# Patient Record
Sex: FEMALE | Race: BLACK OR AFRICAN AMERICAN | NOT HISPANIC OR LATINO | URBAN - METROPOLITAN AREA
[De-identification: names, ages, dates, MRNs, and addresses within clinical notes are randomized per-mention and may not be internally consistent; named-entity substitution may affect disease eponyms.]

---

## 2021-06-21 ENCOUNTER — OFFICE VISIT (OUTPATIENT)
Dept: URBAN - METROPOLITAN AREA CLINIC 96 | Facility: CLINIC | Age: 51
End: 2021-06-21
Payer: COMMERCIAL

## 2021-06-21 ENCOUNTER — WEB ENCOUNTER (OUTPATIENT)
Dept: URBAN - METROPOLITAN AREA CLINIC 96 | Facility: CLINIC | Age: 51
End: 2021-06-21

## 2021-06-21 VITALS
DIASTOLIC BLOOD PRESSURE: 85 MMHG | SYSTOLIC BLOOD PRESSURE: 127 MMHG | HEART RATE: 64 BPM | BODY MASS INDEX: 33.04 KG/M2 | TEMPERATURE: 97.8 F | HEIGHT: 68 IN | WEIGHT: 218 LBS

## 2021-06-21 DIAGNOSIS — R11.0 NAUSEA: ICD-10-CM

## 2021-06-21 DIAGNOSIS — R10.30 LOWER ABDOMINAL PAIN OF UNKNOWN ETIOLOGY: ICD-10-CM

## 2021-06-21 DIAGNOSIS — R10.84 ABDOMINAL CRAMPING, GENERALIZED: ICD-10-CM

## 2021-06-21 DIAGNOSIS — Z12.11 COLON CANCER SCREENING: ICD-10-CM

## 2021-06-21 PROCEDURE — 99244 OFF/OP CNSLTJ NEW/EST MOD 40: CPT | Performed by: INTERNAL MEDICINE

## 2021-06-21 RX ORDER — SODIUM SULFATE, MAGNESIUM SULFATE, AND POTASSIUM CHLORIDE 17.75; 2.7; 2.25 G/1; G/1; G/1
12 TABLETS TABLET ORAL
Qty: 24 TABLET | Refills: 0 | OUTPATIENT
Start: 2021-06-21 | End: 2021-06-22

## 2021-06-21 NOTE — HPI-TODAY'S VISIT:
The patient was referred by Dr. Rosalino Ryan for lower abd pains.   A copy of this document is being forwarded to the referring provider.  50 y.o. AAF, lives w/ daughter, works in IT support Seen in past by Dr. Pinto This yr, recently, having stomach issues After eating, will have really bad sharp pains associated w/ sweating Lower in nature Has to go to bathroom - will help it Pains last for 1 hr Not sure what is causing it Eating more fruits and veggies Last episode was w/ pizza No blood in stool Nausea associated

## 2021-06-21 NOTE — PHYSICAL EXAM CHEST:
no lesions,  no deformities,  no traumatic injuries,  no significant scars are present,  chest wall non-tender,  no masses present, breathing is unlabored without accessory muscle use, normal breath sounds
- - -

## 2021-06-22 LAB
A/G RATIO: 1.8
ALBUMIN: 4.6
ALKALINE PHOSPHATASE: 63
ALT (SGPT): 10
AST (SGOT): 18
BASO (ABSOLUTE): 0
BASOS: 1
BILIRUBIN, TOTAL: 0.2
BUN/CREATININE RATIO: 11
BUN: 9
CALCIUM: 9.5
CARBON DIOXIDE, TOTAL: 25
CHLORIDE: 102
CREATININE: 0.79
EGFR IF AFRICN AM: 100
EGFR IF NONAFRICN AM: 87
EOS (ABSOLUTE): 0.1
EOS: 2
FERRITIN, SERUM: 22
FOLATE (FOLIC ACID), SERUM: 14.4
GLOBULIN, TOTAL: 2.6
GLUCOSE: 84
HEMATOCRIT: 38.3
HEMATOLOGY COMMENTS:: (no result)
HEMOGLOBIN: 12.3
IMMATURE CELLS: (no result)
IMMATURE GRANS (ABS): 0
IMMATURE GRANULOCYTES: 0
IRON BIND.CAP.(TIBC): 314
IRON SATURATION: 15
IRON: 47
LYMPHS (ABSOLUTE): 2.9
LYMPHS: 54
MCH: 27.2
MCHC: 32.1
MCV: 85
MONOCYTES(ABSOLUTE): 0.5
MONOCYTES: 9
NEUTROPHILS (ABSOLUTE): 1.8
NEUTROPHILS: 34
NRBC: (no result)
PLATELETS: 295
POTASSIUM: 4.6
PROTEIN, TOTAL: 7.2
RBC: 4.53
RDW: 12
SODIUM: 142
UIBC: 267
VITAMIN B12: 336
WBC: 5.4

## 2021-08-10 ENCOUNTER — OFFICE VISIT (OUTPATIENT)
Dept: URBAN - METROPOLITAN AREA CLINIC 97 | Facility: CLINIC | Age: 51
End: 2021-08-10
Payer: COMMERCIAL

## 2021-08-10 DIAGNOSIS — R10.84 ABDOMINAL PAIN, GENERALIZED: ICD-10-CM

## 2021-08-10 DIAGNOSIS — R11.0 NAUSEA: ICD-10-CM

## 2021-08-10 DIAGNOSIS — K80.20 CHOLELITHIASIS: ICD-10-CM

## 2021-08-10 PROCEDURE — 76700 US EXAM ABDOM COMPLETE: CPT | Performed by: INTERNAL MEDICINE

## 2021-08-11 ENCOUNTER — DASHBOARD ENCOUNTERS (OUTPATIENT)
Age: 51
End: 2021-08-11

## 2021-08-11 ENCOUNTER — WEB ENCOUNTER (OUTPATIENT)
Dept: URBAN - METROPOLITAN AREA CLINIC 92 | Facility: CLINIC | Age: 51
End: 2021-08-11

## 2021-08-27 ENCOUNTER — OFFICE VISIT (OUTPATIENT)
Dept: URBAN - METROPOLITAN AREA SURGERY CENTER 18 | Facility: SURGERY CENTER | Age: 51
End: 2021-08-27
Payer: COMMERCIAL

## 2021-08-27 PROCEDURE — 45378 DIAGNOSTIC COLONOSCOPY: CPT | Performed by: INTERNAL MEDICINE

## 2021-08-27 PROCEDURE — G8907 PT DOC NO EVENTS ON DISCHARG: HCPCS | Performed by: INTERNAL MEDICINE

## 2021-11-09 ENCOUNTER — TELEPHONE ENCOUNTER (OUTPATIENT)
Dept: URBAN - METROPOLITAN AREA CLINIC 98 | Facility: CLINIC | Age: 51
End: 2021-11-09